# Patient Record
Sex: FEMALE | Race: WHITE | NOT HISPANIC OR LATINO
[De-identification: names, ages, dates, MRNs, and addresses within clinical notes are randomized per-mention and may not be internally consistent; named-entity substitution may affect disease eponyms.]

---

## 2023-04-07 ENCOUNTER — FORM ENCOUNTER (OUTPATIENT)
Age: 63
End: 2023-04-07

## 2023-04-07 ENCOUNTER — APPOINTMENT (OUTPATIENT)
Dept: ORTHOPEDIC SURGERY | Facility: CLINIC | Age: 63
End: 2023-04-07
Payer: COMMERCIAL

## 2023-04-07 VITALS — BODY MASS INDEX: 23.9 KG/M2 | WEIGHT: 140 LBS | HEIGHT: 64 IN

## 2023-04-07 DIAGNOSIS — Z78.9 OTHER SPECIFIED HEALTH STATUS: ICD-10-CM

## 2023-04-07 PROBLEM — Z00.00 ENCOUNTER FOR PREVENTIVE HEALTH EXAMINATION: Status: ACTIVE | Noted: 2023-04-07

## 2023-04-07 PROCEDURE — 72100 X-RAY EXAM L-S SPINE 2/3 VWS: CPT

## 2023-04-07 PROCEDURE — 99203 OFFICE O/P NEW LOW 30 MIN: CPT

## 2023-04-07 PROCEDURE — 72170 X-RAY EXAM OF PELVIS: CPT

## 2023-04-07 RX ORDER — IBUPROFEN 600 MG/1
600 TABLET, FILM COATED ORAL 3 TIMES DAILY
Qty: 60 | Refills: 1 | Status: ACTIVE | COMMUNITY
Start: 2023-04-07 | End: 1900-01-01

## 2023-04-07 NOTE — PHYSICAL EXAM
[Flexion] : flexion [Scoliosis] : Scoliosis [AP] : anteroposterior [There are no fractures, subluxations or dislocations. No significant abnormalities are seen] : There are no fractures, subluxations or dislocations. No significant abnormalities are seen [] : negative straight leg raise [FreeTextEntry1] : osteopenia, possible old L4 superior end plate compression

## 2023-04-07 NOTE — ASSESSMENT
[FreeTextEntry1] : Has had chronic back issues since a fall, bones appear osteoporotic. Needs MRI to r/o fracture

## 2023-04-07 NOTE — HISTORY OF PRESENT ILLNESS
Tara Juárez discharged to home accompanied by friend and therapist for home visit to discharge.   Patient provided with the following educational materials upon discharge:  [facility] Safety, Risk for falls, Skin health, and Coumadin teaching. Valuables and belongings sent with patient.  discharge instructions, medications and follow up appointments reviewed with patient. Patient and  verbalized understanding.   [Lower back] : lower back [Gradual] : gradual [Dull/Aching] : dull/aching [Localized] : localized [Sharp] : sharp [de-identified] : 63 yo F here for low back pain. patient has an old injury form falling on ice 10 years ago. She has had multiple flare ups since then after lifting. Now for the last 3 wks has a flare up of pain. No injury. pain is across low back. No radicular sx. [] : no [FreeTextEntry3] : nov 2022

## 2023-04-08 ENCOUNTER — APPOINTMENT (OUTPATIENT)
Dept: MRI IMAGING | Facility: CLINIC | Age: 63
End: 2023-04-08
Payer: COMMERCIAL

## 2023-04-08 PROCEDURE — 72148 MRI LUMBAR SPINE W/O DYE: CPT

## 2023-04-14 ENCOUNTER — APPOINTMENT (OUTPATIENT)
Dept: ORTHOPEDIC SURGERY | Facility: CLINIC | Age: 63
End: 2023-04-14
Payer: COMMERCIAL

## 2023-04-14 VITALS — HEIGHT: 64 IN | BODY MASS INDEX: 23.9 KG/M2 | WEIGHT: 140 LBS

## 2023-04-14 PROCEDURE — 99213 OFFICE O/P EST LOW 20 MIN: CPT

## 2023-04-14 RX ORDER — METHYLPREDNISOLONE 4 MG/1
4 TABLET ORAL
Qty: 1 | Refills: 0 | Status: ACTIVE | COMMUNITY
Start: 2023-04-14 | End: 1900-01-01

## 2023-04-14 RX ORDER — METHOCARBAMOL 750 MG/1
750 TABLET, FILM COATED ORAL
Qty: 30 | Refills: 2 | Status: ACTIVE | COMMUNITY
Start: 2023-04-14 | End: 1900-01-01

## 2023-04-14 NOTE — REASON FOR VISIT
[FreeTextEntry2] : 4/14/23- Had MRI: Impression: \par 1. Scoliosis, exaggerated lumbar lordosis and asymmetric disc bulging, bony ridging, and facet arthrosis on the \par left at L5-S1 where there is encroachment and possible impingement upon the left exiting L5 nerve root with far \par left foraminal herniation suspected.\par 2. Asymmetric left foraminal narrowing at L3-L4 and asymmetric right foraminal narrowing at L4-L5.\par 3. Mild chronic compression fracture at L4 is suspected. Correlation with plain film is recommended.

## 2023-04-14 NOTE — DISCUSSION/SUMMARY
[de-identified] : MRI reviewed, no surgical discs, the compression fracture is old. Will give MDP and Robaxin, start course of PT

## 2023-04-14 NOTE — PHYSICAL EXAM
[Flexion] : flexion [] : patient ambulates without assistive device [Scoliosis] : Scoliosis [AP] : anteroposterior [There are no fractures, subluxations or dislocations. No significant abnormalities are seen] : There are no fractures, subluxations or dislocations. No significant abnormalities are seen [FreeTextEntry1] : osteopenia, possible old L4 superior end plate compression

## 2023-04-14 NOTE — HISTORY OF PRESENT ILLNESS
[Lower back] : lower back [Gradual] : gradual [Dull/Aching] : dull/aching [Localized] : localized [Sharp] : sharp [de-identified] : 61 yo F here for low back pain. patient has an old injury form falling on ice 10 years ago. She has had multiple flare ups since then after lifting. Now for the last 3 wks has a flare up of pain. No injury. pain is across low back. No radicular sx. [] : no [FreeTextEntry3] : nov 2022

## 2023-06-01 ENCOUNTER — APPOINTMENT (OUTPATIENT)
Dept: ORTHOPEDIC SURGERY | Facility: CLINIC | Age: 63
End: 2023-06-01
Payer: COMMERCIAL

## 2023-06-01 VITALS — HEIGHT: 64 IN | BODY MASS INDEX: 23.9 KG/M2 | WEIGHT: 140 LBS

## 2023-06-01 DIAGNOSIS — S39.012A STRAIN OF MUSCLE, FASCIA AND TENDON OF LOWER BACK, INITIAL ENCOUNTER: ICD-10-CM

## 2023-06-01 DIAGNOSIS — M54.50 LOW BACK PAIN, UNSPECIFIED: ICD-10-CM

## 2023-06-01 PROCEDURE — 99214 OFFICE O/P EST MOD 30 MIN: CPT

## 2023-06-01 NOTE — PHYSICAL EXAM
[Normal Coordination] : normal coordination [Normal Sensation] : normal sensation [Orientated] : orientated [Able to Communicate] : able to communicate [Normal Skin] : normal skin [No obvious lymphadenopathy in areas examined] : no obvious lymphadenopathy in areas examined [Peripheral vascular exam is grossly normal] : peripheral vascular exam is grossly normal [] : sensory exam non-focal throughout both lower extremities

## 2023-06-03 NOTE — IMAGING
[de-identified] : ROM: Full with stiffness. \par Strength 5/5 b/l UE LE  \par Sensation I LT \par

## 2023-06-03 NOTE — HISTORY OF PRESENT ILLNESS
[Lower back] : lower back [Sudden] : sudden [10] : 10 [Dull/Aching] : dull/aching [Localized] : localized [Sharp] : sharp [Constant] : constant [Nothing helps with pain getting better] : Nothing helps with pain getting better [de-identified] : had an fall 15 years ago, low back pain since then, not treated pain went away; 1 month ago had a flare up was seen by Dr. Kumar who did an MRI; last week had another flare up of LBP, no radiation to LE, pain exacerbates when bending forward. Denies N/T. Has tried PT with mild relief.  [] : no [FreeTextEntry5] : Pt is here for new consult of L Spine. Was doing better but heard "pop" when bending forward a few days ago. Fell on ice 10 years ago; started back issues.  [de-identified] : MRI L Spine

## 2023-06-03 NOTE — ASSESSMENT
[FreeTextEntry1] : LS MRI shows no structural pathology; mld stenosis with no nerve compression; will start with PT for now and if there is no improvement will continue with pain management. Nidia in severe pain gave paraspinal lumbar TPI. Will f/up 4-6 weeks to eval improvement with PT\par

## 2023-06-03 NOTE — DATA REVIEWED
[MRI] : MRI [Lumbar Spine] : lumbar spine [Report was reviewed and noted in the chart] : The report was reviewed and noted in the chart [I independently reviewed and interpreted images and report] : I independently reviewed and interpreted images and report [I reviewed the films/CD] : I reviewed the films/CD [FreeTextEntry1] : LS MRI mild stenosis with no overt nerve root compression

## 2023-06-09 ENCOUNTER — APPOINTMENT (OUTPATIENT)
Dept: ORTHOPEDIC SURGERY | Facility: CLINIC | Age: 63
End: 2023-06-09